# Patient Record
Sex: MALE | Race: WHITE | URBAN - NONMETROPOLITAN AREA
[De-identification: names, ages, dates, MRNs, and addresses within clinical notes are randomized per-mention and may not be internally consistent; named-entity substitution may affect disease eponyms.]

---

## 2023-05-23 ENCOUNTER — HOSPITAL ENCOUNTER (EMERGENCY)
Age: 28
Discharge: PSYCHIATRIC OR SUBSTANCE USE FACILITY | End: 2023-05-24
Attending: EMERGENCY MEDICINE

## 2023-05-23 DIAGNOSIS — R45.851 DEPRESSION WITH SUICIDAL IDEATION: Primary | ICD-10-CM

## 2023-05-23 DIAGNOSIS — F32.A DEPRESSION WITH SUICIDAL IDEATION: Primary | ICD-10-CM

## 2023-05-23 LAB
ACETAMINOPHEN LEVEL: <5 UG/ML (ref 15–30)
ALBUMIN SERPL-MCNC: 4.6 GM/DL (ref 3.4–5)
ALCOHOL SCREEN SERUM: <0.01 %WT/VOL
ALP BLD-CCNC: 65 IU/L (ref 40–129)
ALT SERPL-CCNC: 16 U/L (ref 10–40)
AMPHETAMINES: NEGATIVE
ANION GAP SERPL CALCULATED.3IONS-SCNC: 11 MMOL/L (ref 4–16)
AST SERPL-CCNC: 19 IU/L (ref 15–37)
BARBITURATE SCREEN URINE: NEGATIVE
BASOPHILS ABSOLUTE: 0.1 K/CU MM
BASOPHILS RELATIVE PERCENT: 0.7 % (ref 0–1)
BENZODIAZEPINE SCREEN, URINE: NEGATIVE
BILIRUB SERPL-MCNC: 0.3 MG/DL (ref 0–1)
BUN SERPL-MCNC: 16 MG/DL (ref 6–23)
CALCIUM SERPL-MCNC: 9.1 MG/DL (ref 8.3–10.6)
CANNABINOID SCREEN URINE: NEGATIVE
CHLORIDE BLD-SCNC: 106 MMOL/L (ref 99–110)
CO2: 22 MMOL/L (ref 21–32)
COCAINE METABOLITE: NEGATIVE
CREAT SERPL-MCNC: 0.8 MG/DL (ref 0.9–1.3)
DIFFERENTIAL TYPE: ABNORMAL
EOSINOPHILS ABSOLUTE: 0 K/CU MM
EOSINOPHILS RELATIVE PERCENT: 0.5 % (ref 0–3)
FENTANYL URINE: NEGATIVE
GFR SERPL CREATININE-BSD FRML MDRD: >60 ML/MIN/1.73M2
GLUCOSE SERPL-MCNC: 99 MG/DL (ref 70–99)
HCT VFR BLD CALC: 44 % (ref 42–52)
HEMOGLOBIN: 15.3 GM/DL (ref 13.5–18)
IMMATURE NEUTROPHIL %: 0.1 % (ref 0–0.43)
LYMPHOCYTES ABSOLUTE: 3.1 K/CU MM
LYMPHOCYTES RELATIVE PERCENT: 42.7 % (ref 24–44)
MCH RBC QN AUTO: 32.8 PG (ref 27–31)
MCHC RBC AUTO-ENTMCNC: 34.8 % (ref 32–36)
MCV RBC AUTO: 94.2 FL (ref 78–100)
MONOCYTES ABSOLUTE: 0.6 K/CU MM
MONOCYTES RELATIVE PERCENT: 8.5 % (ref 0–4)
OPIATES, URINE: NEGATIVE
OXYCODONE: NEGATIVE
PDW BLD-RTO: 11.8 % (ref 11.7–14.9)
PLATELET # BLD: 175 K/CU MM (ref 140–440)
PMV BLD AUTO: 10.9 FL (ref 7.5–11.1)
POTASSIUM SERPL-SCNC: 3.5 MMOL/L (ref 3.5–5.1)
RBC # BLD: 4.67 M/CU MM (ref 4.6–6.2)
SALICYLATE LEVEL: <0.3 MG/DL (ref 15–30)
SARS-COV-2 RDRP RESP QL NAA+PROBE: NOT DETECTED
SEGMENTED NEUTROPHILS ABSOLUTE COUNT: 3.5 K/CU MM
SEGMENTED NEUTROPHILS RELATIVE PERCENT: 47.5 % (ref 36–66)
SODIUM BLD-SCNC: 139 MMOL/L (ref 135–145)
SOURCE: NORMAL
TOTAL IMMATURE NEUTOROPHIL: 0.01 K/CU MM
TOTAL PROTEIN: 7.6 GM/DL (ref 6.4–8.2)
WBC # BLD: 7.3 K/CU MM (ref 4–10.5)

## 2023-05-23 PROCEDURE — 85025 COMPLETE CBC W/AUTO DIFF WBC: CPT

## 2023-05-23 PROCEDURE — 99285 EMERGENCY DEPT VISIT HI MDM: CPT

## 2023-05-23 PROCEDURE — G0480 DRUG TEST DEF 1-7 CLASSES: HCPCS

## 2023-05-23 PROCEDURE — 87635 SARS-COV-2 COVID-19 AMP PRB: CPT

## 2023-05-23 PROCEDURE — 80053 COMPREHEN METABOLIC PANEL: CPT

## 2023-05-23 PROCEDURE — 93005 ELECTROCARDIOGRAM TRACING: CPT | Performed by: EMERGENCY MEDICINE

## 2023-05-23 PROCEDURE — 80307 DRUG TEST PRSMV CHEM ANLYZR: CPT

## 2023-05-23 RX ORDER — BUSPIRONE HYDROCHLORIDE 10 MG/1
10 TABLET ORAL 3 TIMES DAILY
COMMUNITY

## 2023-05-23 RX ORDER — SERTRALINE HYDROCHLORIDE 100 MG/1
100 TABLET, FILM COATED ORAL DAILY
COMMUNITY

## 2023-05-23 RX ORDER — HYDROXYZINE HYDROCHLORIDE 10 MG/1
10 TABLET, FILM COATED ORAL 3 TIMES DAILY PRN
COMMUNITY

## 2023-05-23 RX ORDER — DIVALPROEX SODIUM 500 MG/1
500 TABLET, DELAYED RELEASE ORAL DAILY
COMMUNITY

## 2023-05-23 RX ORDER — DICYCLOMINE HYDROCHLORIDE 10 MG/1
10 CAPSULE ORAL
COMMUNITY

## 2023-05-23 NOTE — ED TRIAGE NOTES
Pt brought by [facility] into ED bed 6. Pt c/o suicidal ideations since yesterday. Pt states \" I have thoughts to walk in front of a car. Is it worth it for me to stay alive. I've been very depressed\". Pt is soft spoken and not making eye contact during triage.

## 2023-05-23 NOTE — ED PROVIDER NOTES
mouth 3 times daily    DICYCLOMINE (BENTYL) 10 MG CAPSULE    Take 1 capsule by mouth 4 times daily (before meals and nightly)    DIVALPROEX (DEPAKOTE) 500 MG DR TABLET    Take 1 tablet by mouth daily    HYDROXYZINE HCL (ATARAX) 10 MG TABLET    Take 1 tablet by mouth 3 times daily as needed for Itching    SERTRALINE (ZOLOFT) 100 MG TABLET    Take 1 tablet by mouth daily    TOPIRAMATE (TOPAMAX) 200 MG TABLET    Take 1 tablet by mouth daily     Allergies   Allergen Reactions    Bactrim [Sulfamethoxazole-Trimethoprim] Seizure        Physical Exam       ED Triage Vitals   BP Temp Temp Source Pulse Respirations SpO2 Height Weight - Scale   05/23/23 1900 05/23/23 1900 05/23/23 1900 05/23/23 1900 05/23/23 1900 05/23/23 1900 05/23/23 1902 05/23/23 1902   (!) 140/92 98.4 °F (36.9 °C) Oral 85 18 98 % 5' 11\" (1.803 m) 155 lb (70.3 kg)     GENERAL APPEARANCE: Awake and alert. Cooperative. No acute distress. HEAD: Normocephalic. Atraumatic. EYES: Sclera anicteric. Pupils equal round reactive to light extraocular movements are intact  ENT: Tolerates saliva. No trismus. Moist mucous membranes  NECK: Supple. Trachea midline. No meningismus  CARDIO: RRR. Radial pulse 2+. No murmurs rubs or gallops appreciated  LUNGS: Respirations unlabored. CTAB. No accessory muscle usage noted. No wheezes rales rhonchi or stridor. ABDOMEN: Soft. Non-distended. Non-tender. No tenderness in right upper quadrant or right lower quadrant to deep palpation  EXTREMITIES: No acute deformities. No unilateral leg swelling or tenderness behind either one of calves  SKIN: Warm and dry. No erythema edema or rashes appreciated  NEUROLOGICAL:  Cranial nerves II through XII grossly intact. No gross facial drooping. Moves all 4 extremities spontaneously. PSYCHIATRIC: Normal mood. Alert and oriented x3.   Patient endorsing active suicidal ideation with plan    Diagnostics   Labs:  Results for orders placed or performed during the hospital encounter

## 2023-05-24 ENCOUNTER — HOSPITAL ENCOUNTER (OUTPATIENT)
Age: 28
Setting detail: SPECIMEN
Discharge: HOME | End: 2023-05-24

## 2023-05-24 VITALS
BODY MASS INDEX: 21.7 KG/M2 | WEIGHT: 155 LBS | DIASTOLIC BLOOD PRESSURE: 52 MMHG | OXYGEN SATURATION: 98 % | TEMPERATURE: 97.8 F | HEART RATE: 59 BPM | HEIGHT: 71 IN | SYSTOLIC BLOOD PRESSURE: 96 MMHG | RESPIRATION RATE: 15 BRPM

## 2023-05-24 LAB
CHOLEST SERPL-MCNC: 188 MG/DL
EKG ATRIAL RATE: 73 BPM
EKG DIAGNOSIS: NORMAL
EKG P AXIS: 45 DEGREES
EKG P-R INTERVAL: 134 MS
EKG Q-T INTERVAL: 386 MS
EKG QRS DURATION: 86 MS
EKG QTC CALCULATION (BAZETT): 425 MS
EKG R AXIS: 40 DEGREES
EKG T AXIS: 11 DEGREES
EKG VENTRICULAR RATE: 73 BPM
HDLC SERPL-MCNC: 37 MG/DL
LDLC SERPL CALC-MCNC: 117 MG/DL
TRIGL SERPL-MCNC: 168 MG/DL
TSH SERPL DL<=0.005 MIU/L-ACNC: 1.81 UIU/ML (ref 0.27–4.2)

## 2023-05-24 PROCEDURE — 84443 ASSAY THYROID STIM HORMONE: CPT

## 2023-05-24 PROCEDURE — 80061 LIPID PANEL: CPT

## 2023-05-24 PROCEDURE — 36415 COLL VENOUS BLD VENIPUNCTURE: CPT

## 2023-05-24 PROCEDURE — 93010 ELECTROCARDIOGRAM REPORT: CPT | Performed by: INTERNAL MEDICINE

## 2023-05-24 NOTE — ED NOTES
Rests quietly with eyes closed. No C/O voiced. Sitter at bedside.      Dorian Dukes RN  05/24/23 [id number]

## 2023-05-24 NOTE — ED NOTES
Pink slip faxed to [facility]. Continues to sleep. Sitter at bedside.      Ramakrishna Benson RN  05/24/23 [phone] Luis PALENCIA RN  05/24/23 0535

## 2023-05-24 NOTE — ED NOTES
Dr Lv Orellana calls to say he will be talking with pt shortly on the IPad.      Ana Foster RN  05/23/23 [id number]

## 2023-05-24 NOTE — ED NOTES
Report called to Eliza HECK at [facility].      Randi Ramon RN  05/24/23 [address] [facility] [address] [facility] [address], RN  05/24/23 [id number]

## 2023-05-24 NOTE — ED NOTES
Pt answers questions and is calm at this time. Sitter at the bedside.      Kandace Aguirre RN  05/23/23 [id number]

## 2023-05-30 ENCOUNTER — CLINICAL DOCUMENTATION (OUTPATIENT)
Dept: INTERNAL MEDICINE CLINIC | Age: 28
End: 2023-05-30